# Patient Record
(demographics unavailable — no encounter records)

---

## 2025-07-02 NOTE — ASSESSMENT
[FreeTextEntry1] : Subjective memory loss Did well on mental status testing in the office today  I have stressed the importance of mental and physical activity, adequate hydration, and eating a healthy Mediterranean style diet.  Memory loss seems to be progressive as per the daughter so we will start her on donepezil 5 mg a day Potential side effects have been discussed I have explained in great detail that currently available medications work to help slow down further memory decline rather than reverse existing memory loss and they seem to understand this.  I have asked the daughter to call me in a month and if she is tolerating the 5 mg dose we will increase to the therapeutic 10 mg dose  Office follow-up in 3 months

## 2025-07-02 NOTE — REASON FOR VISIT
[Follow-Up: _____] : a [unfilled] follow-up visit [Family Member] : family member [Ad Hoc ] : provided by an ad hoc  [Interpreters_Relationshiptopatient] : Daughter [TWNoteComboBox1] : [place]

## 2025-07-02 NOTE — HISTORY OF PRESENT ILLNESS
[FreeTextEntry1] : I saw her initially 7/17/2023 with the following history. History and examination carried out with  Patient reports forgetfulness going on for about 3 years.  Believes it is slowly progressive.  She works as a  without a problem.  She does not drive.  She has no trouble carrying out all activities of daily living.  She has not done anything to cause danger to herself or others  She does have a history of headaches.  They were severe about 2 years ago, now not a significant problem  She has a sensation like there is a butterfly inside her ear.  She saw ENT who could not find anything  Medical history otherwise significant for hyperlipidemia, currently controlled with diet  No tobacco or alcohol use.  At that time we did an EEG which was normal Thyroid functions and B12 were normal Brain MRI showed mild small vessel ischemic changes Results at that time were discussed with her daughter over the phone.  Returns today, 7/2/2025 with her daughter for the first time since that initial visit.  Her daughter interprets. Daughter says her memory has gotten worse She lives with her daughter and tends to be forgetful around the house She manages fine with all activities of daily living She has no abnormal behavior There are no hallucinations She continues to work but at times makes mistakes.

## 2025-07-02 NOTE — PHYSICAL EXAM
[General Appearance - Alert] : alert [General Appearance - In No Acute Distress] : in no acute distress [General Appearance - Well-Appearing] : healthy appearing [Oriented To Time, Place, And Person] : oriented to person, place, and time [Impaired Insight] : insight and judgment were intact [Affect] : the affect was normal [Memory Recent] : recent memory was not impaired [Person] : oriented to person [Place] : oriented to place [Time] : oriented to time [Short Term Intact] : short term memory intact [Remote Intact] : remote memory intact [Registration Intact] : recent registration memory intact [Span Intact] : the attention span was normal [Concentration Intact] : normal concentrating ability [Naming Objects] : no difficulty naming common objects [Repeating Phrases] : no difficulty repeating a phrase [Fluency] : fluency intact [Comprehension] : comprehension intact [Past History] : adequate knowledge of personal past history [Cranial Nerves Optic (II)] : visual acuity intact bilaterally,  visual fields full to confrontation, pupils equal round and reactive to light [Cranial Nerves Oculomotor (III)] : extraocular motion intact [Cranial Nerves Trigeminal (V)] : facial sensation intact symmetrically [Cranial Nerves Facial (VII)] : face symmetrical [Cranial Nerves Vestibulocochlear (VIII)] : hearing was intact bilaterally [Cranial Nerves Glossopharyngeal (IX)] : tongue and palate midline [Cranial Nerves Accessory (XI - Cranial And Spinal)] : head turning and shoulder shrug symmetric [Cranial Nerves Hypoglossal (XII)] : there was no tongue deviation with protrusion [Motor Tone] : muscle tone was normal in all four extremities [Motor Strength] : muscle strength was normal in all four extremities [No Muscle Atrophy] : normal bulk in all four extremities [Paresis Pronator Drift Right-Sided] : no pronator drift on the right [Paresis Pronator Drift Left-Sided] : no pronator drift on the left [Sensation Tactile Decrease] : light touch was intact [Sensation Pain / Temperature Decrease] : pain and temperature was intact [Romberg's Sign] : Romberg's sign was negtive [Abnormal Walk] : normal gait [Balance] : balance was intact [Past-pointing] : there was no past-pointing [Tremor] : no tremor present [Coordination - Dysmetria Impaired Finger-to-Nose Bilateral] : not present [Coordination - Dysmetria Impaired Heel-to-Shin Bilateral] : not present [2+] : Ankle jerk left 2+ [Plantar Reflex Right Only] : normal on the right [Plantar Reflex Left Only] : normal on the left [FreeTextEntry4] : Short-term recall 3/3. [PERRL With Normal Accommodation] : pupils were equal in size, round, reactive to light, with normal accommodation [Extraocular Movements] : extraocular movements were intact [Full Visual Field] : full visual field